# Patient Record
Sex: FEMALE | Race: WHITE | NOT HISPANIC OR LATINO | URBAN - METROPOLITAN AREA
[De-identification: names, ages, dates, MRNs, and addresses within clinical notes are randomized per-mention and may not be internally consistent; named-entity substitution may affect disease eponyms.]

---

## 2023-07-15 ENCOUNTER — EMERGENCY (EMERGENCY)
Facility: HOSPITAL | Age: 43
LOS: 0 days | Discharge: HOME | End: 2023-07-16
Attending: EMERGENCY MEDICINE
Payer: COMMERCIAL

## 2023-07-15 VITALS
OXYGEN SATURATION: 99 % | RESPIRATION RATE: 18 BRPM | WEIGHT: 179.9 LBS | HEART RATE: 83 BPM | TEMPERATURE: 98 F | DIASTOLIC BLOOD PRESSURE: 75 MMHG | SYSTOLIC BLOOD PRESSURE: 110 MMHG

## 2023-07-15 DIAGNOSIS — S52.502A UNSPECIFIED FRACTURE OF THE LOWER END OF LEFT RADIUS, INITIAL ENCOUNTER FOR CLOSED FRACTURE: ICD-10-CM
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
## 2023-07-15 DIAGNOSIS — M25.532 PAIN IN LEFT WRIST: ICD-10-CM

## 2023-07-15 PROCEDURE — 73090 X-RAY EXAM OF FOREARM: CPT | Mod: LT

## 2023-07-15 PROCEDURE — 99284 EMERGENCY DEPT VISIT MOD MDM: CPT | Mod: 25

## 2023-07-15 PROCEDURE — 29125 APPL SHORT ARM SPLINT STATIC: CPT

## 2023-07-15 PROCEDURE — 29125 APPL SHORT ARM SPLINT STATIC: CPT | Mod: LT

## 2023-07-15 PROCEDURE — 73110 X-RAY EXAM OF WRIST: CPT | Mod: LT

## 2023-07-15 RX ORDER — IBUPROFEN 200 MG
400 TABLET ORAL ONCE
Refills: 0 | Status: COMPLETED | OUTPATIENT
Start: 2023-07-15 | End: 2023-07-15

## 2023-07-15 RX ORDER — ACETAMINOPHEN 500 MG
650 TABLET ORAL ONCE
Refills: 0 | Status: COMPLETED | OUTPATIENT
Start: 2023-07-15 | End: 2023-07-15

## 2023-07-15 RX ADMIN — Medication 650 MILLIGRAM(S): at 23:50

## 2023-07-15 RX ADMIN — Medication 400 MILLIGRAM(S): at 23:49

## 2023-07-15 NOTE — ED PROVIDER NOTE - PHYSICAL EXAMINATION
CONSTITUTIONAL: Well-developed; well-nourished; NAD  SKIN: warm, dry, w/o rash  HEAD: NCAT  EYES: PERRLA, EOMI, no conjunctival injection  ENT: No nasal discharge; nl OP without erythema or exudates  NECK: Supple, non-tender  CARD: nl S1, S2; RRR, no MRG, no JVD  RESP: CTAB, normal respiratory effort  ABD: BS+, soft, NTND, no HSM  EXT: Normal ROM aside from LT wrist.  Mild swelling of the left wrist, radial, ulnar, median sensation and motor intact in left upper extremity, normal cap refill and distal pulses, mild ecchymosis over anterior surface of the left wrist  NEURO: Alert, oriented, grossly unremarkable  PSYCH: Cooperative, appropriate

## 2023-07-15 NOTE — ED PROVIDER NOTE - CLINICAL SUMMARY MEDICAL DECISION MAKING FREE TEXT BOX
43 y.o. female, no PMH, presenting for FOOSH onto left wrist.  Patient was at water park earlier today, fell onto her left outstretched hand, immediately felt pain to left wrist. Since that time pain has increased.  Patient denies numbness, weakness.  Patient took 2 regular strength Advil prior to coming to emergency department.  Patient otherwise well. On exam, pt in NAD, AAOx3, head NC/AT, CN II-XII intact, PEERL, EOMi, neck (-) midline tenderness, lungs CTA B/L, CV S1S2 regular, abdomen soft/NT/ND/(+)BS, ext (+) swelling and tenderness to left wrist, FROM of digits, (+) 2 rings noted on 4th digit, unable to remove rings due to swelling. XR (+) distal radius fx. Rings removed. Splint applied. Will d/c with ortho follow up. Return precautions given.

## 2023-07-15 NOTE — ED PROVIDER NOTE - CARE PROVIDER_API CALL
Ian Narvaez  Orthopaedic Surgery  [address] [place] [address]  Phone: [phone]  Fax: [phone]  Follow Up Time: 4-6 Days

## 2023-07-15 NOTE — ED PROVIDER NOTE - CARE PLAN
Principal Discharge DX:	Distal radius fracture, left   1 Principal Discharge DX:	Distal radius fracture, left  Secondary Diagnosis:	Fall

## 2023-07-15 NOTE — ED PROVIDER NOTE - PATIENT PORTAL LINK FT
You can access the FollowMyHealth Patient Portal offered by [facility] by registering at the following website: [email or web address]. By joining [employer]’s FollowMyHealth portal, you will also be able to view your health information using other applications (apps) compatible with our system.

## 2023-07-15 NOTE — ED PROVIDER NOTE - NSFOLLOWUPINSTRUCTIONS_ED_ALL_ED_FT
Our Emergency Department Referral Coordinators will be reaching out to you in the next 24-48 hours from 9:00am to 5:00pm with a follow up appointment. Please expect a phone call from the hospital in that time frame. If you do not receive a call or if you have any questions or concerns, you can reach them at   [phone]      Radial Fracture       A radial fracture is a break in the radius bone. The radius is a bone in the forearm, on the same side as the thumb. The forearm is the part of the arm that is between the elbow and the wrist. A radial fracture near the wrist (distal radialfracture) is the most common type of broken arm. A fracture can also occur near the elbow (radial head fracture).      What are the causes?  The most common cause of a radial fracture is falling with the arm outstretched. Other causes include:  •An accident, such as a car or bike accident.      •A hard, direct hit to the forearm.        What increases the risk?  You may be at greater risk for a radial fracture if you:  •Are female.      •Are an older adult.      •Play contact sports.      •Have a condition that causes your bones to become thin and brittle (osteoporosis).        What are the signs or symptoms?  A radial fracture causes pain immediately after the injury. Other signs and symptoms may include:  •An abnormal bend or bump in the arm (deformity).      •Swelling.      •Bruising.      •Numbness or tingling in your arm and hand.      •Limited movement of your arm and hand.        How is this diagnosed?  This condition may be diagnosed based on:  •Your symptoms and medical history.      •A physical exam.      •An X-ray.        How is this treated?  Treatment depends on how severe your fracture is, where it is, and how the pieces of the broken bone line up with each other (alignment).  •The first step may be for you to wear a temporary splint for a few days, until your swelling goes down. After the swelling goes down, you may get a cast, get a different type of splint, or have surgery.      •If your broken bone is in good alignment, you will need to wear a splint or cast for up to 6 weeks.    •If your broken bone is not aligned (is displaced), your health care provider will need to align the bone pieces. After alignment, you will need to wear a splint or cast for up to 6 weeks. To align your broken bone, your health care provider may:  •Move the bones back into position without surgery (closed reduction).      •Perform surgery to align the fracture and fix the bone pieces into place with metal screws, plates, or wires (open reduction and internal fixation, ORIF).      •Perform surgery to align the fracture and fix the bone pieces into place with pins that are attached to a stabilizing bar outside your skin (external fixation).      Treatment may also include:  •Having your cast changed after 2–3 weeks.      •Physical therapy.      •Follow-up visits and X-rays to make sure you are healing.        Follow these instructions at home:    If you have a splint:     •Wear it as told by your health care provider. Remove it only as told by your health care provider.       •Loosen the splint if your fingers tingle, become numb, or turn cold and blue.       •Keep the splint clean and dry.      If you have a cast:     • Do not stick anything inside the cast to scratch your skin. Doing that increases your risk for infection.       •Check the skin around the cast every day. Tell your health care provider about any concerns.       •You may put lotion on dry skin around the edges of the cast. Do not put lotion on the skin underneath the cast.      •Keep the cast clean and dry.      Bathing     • Do not take baths, swim, or use a hot tub until your health care provider approves. Ask your health care provider if you may take showers. You may only be allowed to take sponge baths.    •If your splint or cast is not waterproof:  •Do not let it get wet.      •Cover it with a watertight covering when you take a bath or a shower.        Activity     •  Do not lift anything with your injured arm.      • Do not use the injured arm to support your body weight until your health care provider says that you can.      •Ask your health care provider what activities are safe for you during recovery, and ask what activities you need to avoid.        Managing pain, stiffness, and swelling    •If directed, put ice on painful areas:   •If you have a removable splint, remove it as told by your health care provider.       •Put ice in a plastic bag.       •Place a towel between your skin and the bag, or between your cast and the bag.      •Leave the ice on for 20 minutes, 2–3 times a day.        • Move your fingers often to avoid stiffness and to lessen swelling.       •Raise (elevate) your arm above the level of your heart while you are sitting or lying down.      General instructions     • Do not put pressure on any part of the cast or splint until it is fully hardened, if applicable. This may take several hours.       •Take over-the-counter and prescription medicines only as told by your health care provider.      •  Do not drive until your health care provider approves. You should not drive or use heavy machinery while taking prescription pain medicine.       • Do not use any products that contain nicotine or tobacco, such as cigarettes and e-cigarettes. These can delay bone healing. If you need help quitting, ask your health care provider.      •Keep all follow-up visits as told by your health care provider. This is important.        Contact a health care provider if you have:    •Pain that does not get better with medicine.      •Swelling that gets worse.      •A bad smell coming from your cast.        Get help right away if:    •You cannot move your fingers.      •You have severe pain.    •Your fingers or your hand:  •Become numb, cold, or pale.      •Turn a bluish color.          Summary    •A radial fracture is a break in the radius bone. The radius is in the forearm, on the same side as the thumb.      •Treatment depends on how severe your fracture is, where it is, and how the pieces of the broken bone line up with each other.      •A splint or cast may be needed to help the fracture heal. A more severe break may require surgery.      This information is not intended to replace advice given to you by your health care provider. Make sure you discuss any questions you have with your health care provider.      Document Revised: 12/12/2018 Document Reviewed: 12/12/2018    Elsevier Patient Education © 2021 [employer]

## 2023-07-15 NOTE — ED ADULT NURSE NOTE - NSFALLRISKINTERV_ED_ALL_ED

## 2023-07-15 NOTE — ED PROVIDER NOTE - OBJECTIVE STATEMENT
Patient is a 43-year-old female with no past medical history presenting for FOOSH onto left wrist.  Patient was at [facility] [employer] [place] earlier today, fell onto her left outstretched hand, immediately sustained left wrist pain.  Since that time pain has increased.  Patient denies numbness, weakness.  Patient took 2 regular strength Advil prior to coming to emergency department.  Patient otherwise well

## 2023-07-16 VITALS
SYSTOLIC BLOOD PRESSURE: 115 MMHG | OXYGEN SATURATION: 99 % | DIASTOLIC BLOOD PRESSURE: 66 MMHG | TEMPERATURE: 98 F | HEART RATE: 75 BPM | RESPIRATION RATE: 18 BRPM

## 2023-07-16 PROCEDURE — 73090 X-RAY EXAM OF FOREARM: CPT | Mod: 26,LT

## 2023-07-16 PROCEDURE — 73110 X-RAY EXAM OF WRIST: CPT | Mod: 26,LT

## 2023-07-16 RX ADMIN — Medication 400 MILLIGRAM(S): at 00:20

## 2023-07-16 RX ADMIN — Medication 650 MILLIGRAM(S): at 00:20

## 2023-07-17 ENCOUNTER — APPOINTMENT (OUTPATIENT)
Dept: ORTHOPEDIC SURGERY | Facility: CLINIC | Age: 43
End: 2023-07-17
Payer: COMMERCIAL

## 2023-07-17 DIAGNOSIS — S52.592A OTHER FRACTURES OF LOWER END OF LEFT RADIUS, INITIAL ENCOUNTER FOR CLOSED FRACTURE: ICD-10-CM

## 2023-07-17 PROBLEM — Z00.00 ENCOUNTER FOR PREVENTIVE HEALTH EXAMINATION: Status: ACTIVE | Noted: 2023-07-17

## 2023-07-17 PROCEDURE — 99203 OFFICE O/P NEW LOW 30 MIN: CPT | Mod: 25

## 2023-07-17 PROCEDURE — 73110 X-RAY EXAM OF WRIST: CPT | Mod: LT

## 2023-07-17 PROCEDURE — 29075 APPL CST ELBW FNGR SHORT ARM: CPT | Mod: LT

## 2023-07-17 RX ORDER — NAPROXEN 500 MG/1
500 TABLET ORAL
Qty: 60 | Refills: 0 | Status: ACTIVE | COMMUNITY
Start: 2023-07-17 | End: 1900-01-01

## 2023-07-17 NOTE — HISTORY OF PRESENT ILLNESS
[de-identified] : Patient is a 43-year-old female here for evaluation of left wrist injury.  Patient states on 7/15/2023 she was at a water park with her son.  Patient states she slipped over a children's pool trying not to fall onto a child.  Patient landed on left outstretched wrist.  Patient went to the emergency room, x-rays were taken showing distal radius fracture.  Patient was placed in splint and told to follow-up with orthopedics.  Denies numbness or tingling.

## 2023-07-17 NOTE — PHYSICAL EXAM
[Left] : left hand [Distal Radius] : distal radius [] : no instability w/varus/valgus or ant/post stressing of fingers joints [FreeTextEntry9] : Moderate decreased range of motion secondary to pain [de-identified] : Moderate decrease strength secondary to pain

## 2023-07-17 NOTE — DATA REVIEWED
[FreeTextEntry1] : X-ray left wrist [facility]: Mildly displaced transverse distal radius fracture.  Post cast x-rays taken showing well molded cast, mildly displaced fracture

## 2023-07-17 NOTE — DISCUSSION/SUMMARY
[de-identified] : Discussed x-rays in detail with patient showing mildly displaced fracture of distal radius.  Discussed that generally these types of fractures heal well on their own but some do require surgical intervention.  Discussed treatment options detail.  Placed patient in waterproof cast with good mold.  Post cast x-rays taken showing well molded cast with mildly displaced fracture.  Patient will follow-up in 2 weeks for reevaluation by hand department for further evaluation.  Advised patient to move fingers and elbow to avoid stiffness.  Naproxen sent to patient's pharmacy, discussed side effects in detail.  Call if symptoms worsen or change.  Patient understands agrees with plan.

## 2023-07-18 PROBLEM — Z78.9 OTHER SPECIFIED HEALTH STATUS: Chronic | Status: ACTIVE | Noted: 2023-07-15

## 2023-08-04 ENCOUNTER — APPOINTMENT (OUTPATIENT)
Dept: ORTHOPEDIC SURGERY | Facility: CLINIC | Age: 43
End: 2023-08-04

## 2023-08-07 ENCOUNTER — APPOINTMENT (OUTPATIENT)
Dept: ORTHOPEDIC SURGERY | Facility: CLINIC | Age: 43
End: 2023-08-07
Payer: COMMERCIAL

## 2023-08-07 VITALS — HEIGHT: 67 IN | WEIGHT: 175 LBS | BODY MASS INDEX: 27.47 KG/M2

## 2023-08-07 PROCEDURE — 73110 X-RAY EXAM OF WRIST: CPT | Mod: LT

## 2023-08-07 PROCEDURE — 99213 OFFICE O/P EST LOW 20 MIN: CPT

## 2023-08-07 NOTE — PHYSICAL EXAM
[de-identified] : Patient has good range of motion of the digits patient has normal sensation normal cap refill there is no erythema ecchymoses or abrasions.  Cast is in good condition.

## 2023-08-07 NOTE — HISTORY OF PRESENT ILLNESS
[de-identified] : 43-year-old female had a fall resulting injury to the left wrist.  She comes in today for evaluation injury occurred about 2 weeks ago.  She is comfortable in her cast.

## 2023-08-07 NOTE — ASSESSMENT
[FreeTextEntry1] : Patient is a left-sided distal radius fracture.  It is extra-articular.  She will see us back in about 3 weeks with cast off radiographs of the left wrist.  Then will going to a removable wrist brace when she goes to [place].

## 2023-08-28 ENCOUNTER — APPOINTMENT (OUTPATIENT)
Dept: ORTHOPEDIC SURGERY | Facility: CLINIC | Age: 43
End: 2023-08-28
Payer: COMMERCIAL

## 2023-08-28 DIAGNOSIS — S52.552D OTHER EXTRAARTICULAR FRACTURE OF LOWER END OF LEFT RADIUS, SUBSEQUENT ENCOUNTER FOR CLOSED FRACTURE WITH ROUTINE HEALING: ICD-10-CM

## 2023-08-28 PROCEDURE — 99213 OFFICE O/P EST LOW 20 MIN: CPT

## 2023-08-28 PROCEDURE — 73110 X-RAY EXAM OF WRIST: CPT | Mod: LT

## 2023-08-28 PROCEDURE — L3908: CPT | Mod: LT

## 2023-08-28 NOTE — DATA REVIEWED
[FreeTextEntry1] : Radiographs 3 views of the left wrist reviewed showing a dorsally angulated distal radius fracture.  Particular.  Has been change in the fracture alignment since her previous visit.

## 2023-08-28 NOTE — PHYSICAL EXAM
[de-identified] : Patient comes out of her immobilization today.  There is deformity to the wrist.  She has good range of motion of the fingers.  Normal tenderness along the fracture site.  Normal sensation normal capillary refill.

## 2023-08-28 NOTE — HISTORY OF PRESENT ILLNESS
[de-identified] : 43-year-old female has a left-sided wrist fracture.  She comes in today for evaluation.  She has been in a cast.  The injury is about 6 weeks old.  She has no significant complaints in the cast.

## 2023-08-28 NOTE — ASSESSMENT
[FreeTextEntry1] : Fracture shifted in alignment since her last visit we discussed with her surgical intervention for open reduction internal fixation of the fracture.  Risk and benefits of surgery, limited to bleeding infection risk injury stiffness discussed.  Utilizing splinting as discharge was discussed nonoperative treatment was discussed she is going to contemplate the surgical intervention which is what is recommended at this time.

## 2023-09-01 ENCOUNTER — APPOINTMENT (OUTPATIENT)
Dept: ORTHOPEDIC SURGERY | Facility: CLINIC | Age: 43
End: 2023-09-01

## 2023-09-08 ENCOUNTER — APPOINTMENT (OUTPATIENT)
Dept: ORTHOPEDIC SURGERY | Facility: CLINIC | Age: 43
End: 2023-09-08

## 2023-09-15 ENCOUNTER — APPOINTMENT (OUTPATIENT)
Dept: ORTHOPEDIC SURGERY | Facility: CLINIC | Age: 43
End: 2023-09-15

## 2024-04-13 ENCOUNTER — NON-APPOINTMENT (OUTPATIENT)
Age: 44
End: 2024-04-13

## 2024-09-04 ENCOUNTER — NON-APPOINTMENT (OUTPATIENT)
Age: 44
End: 2024-09-04

## 2024-09-08 ENCOUNTER — EMERGENCY (EMERGENCY)
Facility: HOSPITAL | Age: 44
LOS: 0 days | Discharge: HOME | End: 2024-09-09
Attending: STUDENT IN AN ORGANIZED HEALTH CARE EDUCATION/TRAINING PROGRAM
Payer: COMMERCIAL

## 2024-09-08 VITALS
TEMPERATURE: 103 F | OXYGEN SATURATION: 98 % | WEIGHT: 160.06 LBS | HEART RATE: 108 BPM | HEIGHT: 65 IN | DIASTOLIC BLOOD PRESSURE: 67 MMHG | SYSTOLIC BLOOD PRESSURE: 100 MMHG | RESPIRATION RATE: 18 BRPM

## 2024-09-08 DIAGNOSIS — R50.9 FEVER, UNSPECIFIED: ICD-10-CM

## 2024-09-08 DIAGNOSIS — R39.15 URGENCY OF URINATION: ICD-10-CM

## 2024-09-08 DIAGNOSIS — U07.1 COVID-19: ICD-10-CM

## 2024-09-08 DIAGNOSIS — R30.0 DYSURIA: ICD-10-CM

## 2024-09-08 LAB
BASOPHILS # BLD AUTO: 0.02 K/UL — SIGNIFICANT CHANGE UP (ref 0–0.2)
BASOPHILS NFR BLD AUTO: 0.2 % — SIGNIFICANT CHANGE UP (ref 0–1)
EOSINOPHIL # BLD AUTO: 0.04 K/UL — SIGNIFICANT CHANGE UP (ref 0–0.7)
EOSINOPHIL NFR BLD AUTO: 0.5 % — SIGNIFICANT CHANGE UP (ref 0–8)
HCT VFR BLD CALC: 32.7 % — LOW (ref 37–47)
HGB BLD-MCNC: 11.3 G/DL — LOW (ref 12–16)
IMM GRANULOCYTES NFR BLD AUTO: 0.6 % — HIGH (ref 0.1–0.3)
LYMPHOCYTES # BLD AUTO: 0.91 K/UL — LOW (ref 1.2–3.4)
LYMPHOCYTES # BLD AUTO: 11.2 % — LOW (ref 20.5–51.1)
MCHC RBC-ENTMCNC: 30.1 PG — SIGNIFICANT CHANGE UP (ref 27–31)
MCHC RBC-ENTMCNC: 34.6 G/DL — SIGNIFICANT CHANGE UP (ref 32–37)
MCV RBC AUTO: 87.2 FL — SIGNIFICANT CHANGE UP (ref 81–99)
MONOCYTES # BLD AUTO: 0.57 K/UL — SIGNIFICANT CHANGE UP (ref 0.1–0.6)
MONOCYTES NFR BLD AUTO: 7 % — SIGNIFICANT CHANGE UP (ref 1.7–9.3)
NEUTROPHILS # BLD AUTO: 6.5 K/UL — SIGNIFICANT CHANGE UP (ref 1.4–6.5)
NEUTROPHILS NFR BLD AUTO: 80.5 % — HIGH (ref 42.2–75.2)
NRBC # BLD: 0 /100 WBCS — SIGNIFICANT CHANGE UP (ref 0–0)
PLATELET # BLD AUTO: 217 K/UL — SIGNIFICANT CHANGE UP (ref 130–400)
PMV BLD: 9.9 FL — SIGNIFICANT CHANGE UP (ref 7.4–10.4)
RBC # BLD: 3.75 M/UL — LOW (ref 4.2–5.4)
RBC # FLD: 12.7 % — SIGNIFICANT CHANGE UP (ref 11.5–14.5)
WBC # BLD: 8.09 K/UL — SIGNIFICANT CHANGE UP (ref 4.8–10.8)
WBC # FLD AUTO: 8.09 K/UL — SIGNIFICANT CHANGE UP (ref 4.8–10.8)

## 2024-09-08 PROCEDURE — 81001 URINALYSIS AUTO W/SCOPE: CPT

## 2024-09-08 PROCEDURE — 93010 ELECTROCARDIOGRAM REPORT: CPT

## 2024-09-08 PROCEDURE — 84703 CHORIONIC GONADOTROPIN ASSAY: CPT

## 2024-09-08 PROCEDURE — 99285 EMERGENCY DEPT VISIT HI MDM: CPT | Mod: 25

## 2024-09-08 PROCEDURE — 71045 X-RAY EXAM CHEST 1 VIEW: CPT

## 2024-09-08 PROCEDURE — 0241U: CPT

## 2024-09-08 PROCEDURE — 99284 EMERGENCY DEPT VISIT MOD MDM: CPT

## 2024-09-08 PROCEDURE — 85025 COMPLETE CBC W/AUTO DIFF WBC: CPT

## 2024-09-08 PROCEDURE — 71045 X-RAY EXAM CHEST 1 VIEW: CPT | Mod: 26

## 2024-09-08 PROCEDURE — 96374 THER/PROPH/DIAG INJ IV PUSH: CPT

## 2024-09-08 PROCEDURE — 80053 COMPREHEN METABOLIC PANEL: CPT

## 2024-09-08 PROCEDURE — 93005 ELECTROCARDIOGRAM TRACING: CPT

## 2024-09-08 PROCEDURE — 36415 COLL VENOUS BLD VENIPUNCTURE: CPT

## 2024-09-08 RX ORDER — ACETAMINOPHEN 325 MG/1
975 TABLET ORAL ONCE
Refills: 0 | Status: COMPLETED | OUTPATIENT
Start: 2024-09-08 | End: 2024-09-08

## 2024-09-08 RX ORDER — ONDANSETRON 2 MG/ML
4 INJECTION, SOLUTION INTRAMUSCULAR; INTRAVENOUS ONCE
Refills: 0 | Status: COMPLETED | OUTPATIENT
Start: 2024-09-08 | End: 2024-09-08

## 2024-09-08 RX ADMIN — ONDANSETRON 4 MILLIGRAM(S): 2 INJECTION, SOLUTION INTRAMUSCULAR; INTRAVENOUS at 23:30

## 2024-09-08 RX ADMIN — Medication 1500 MILLILITER(S): at 23:30

## 2024-09-08 RX ADMIN — ACETAMINOPHEN 975 MILLIGRAM(S): 325 TABLET ORAL at 23:30

## 2024-09-09 VITALS
TEMPERATURE: 100 F | DIASTOLIC BLOOD PRESSURE: 58 MMHG | RESPIRATION RATE: 18 BRPM | HEART RATE: 96 BPM | SYSTOLIC BLOOD PRESSURE: 94 MMHG | OXYGEN SATURATION: 93 %

## 2024-09-09 LAB
ALBUMIN SERPL ELPH-MCNC: 3.8 G/DL — SIGNIFICANT CHANGE UP (ref 3.5–5.2)
ALP SERPL-CCNC: 58 U/L — SIGNIFICANT CHANGE UP (ref 30–115)
ALT FLD-CCNC: 32 U/L — SIGNIFICANT CHANGE UP (ref 0–41)
ANION GAP SERPL CALC-SCNC: 12 MMOL/L — SIGNIFICANT CHANGE UP (ref 7–14)
APPEARANCE UR: CLEAR — SIGNIFICANT CHANGE UP
AST SERPL-CCNC: 30 U/L — SIGNIFICANT CHANGE UP (ref 0–41)
BACTERIA # UR AUTO: ABNORMAL /HPF
BILIRUB SERPL-MCNC: 0.7 MG/DL — SIGNIFICANT CHANGE UP (ref 0.2–1.2)
BILIRUB UR-MCNC: NEGATIVE — SIGNIFICANT CHANGE UP
BUN SERPL-MCNC: 7 MG/DL — LOW (ref 10–20)
CALCIUM SERPL-MCNC: 8.5 MG/DL — SIGNIFICANT CHANGE UP (ref 8.4–10.4)
CHLORIDE SERPL-SCNC: 101 MMOL/L — SIGNIFICANT CHANGE UP (ref 98–110)
CO2 SERPL-SCNC: 23 MMOL/L — SIGNIFICANT CHANGE UP (ref 17–32)
COLOR SPEC: SIGNIFICANT CHANGE UP
CREAT SERPL-MCNC: 0.7 MG/DL — SIGNIFICANT CHANGE UP (ref 0.7–1.5)
DIFF PNL FLD: ABNORMAL
EGFR: 111 ML/MIN/1.73M2 — SIGNIFICANT CHANGE UP
EPI CELLS # UR: PRESENT
FLUAV AG NPH QL: SIGNIFICANT CHANGE UP
FLUBV AG NPH QL: SIGNIFICANT CHANGE UP
GLUCOSE SERPL-MCNC: 123 MG/DL — HIGH (ref 70–99)
GLUCOSE UR QL: NEGATIVE MG/DL — SIGNIFICANT CHANGE UP
HCG SERPL QL: NEGATIVE — SIGNIFICANT CHANGE UP
KETONES UR-MCNC: ABNORMAL MG/DL
LEUKOCYTE ESTERASE UR-ACNC: ABNORMAL
NITRITE UR-MCNC: NEGATIVE — SIGNIFICANT CHANGE UP
PH UR: 6 — SIGNIFICANT CHANGE UP (ref 5–8)
POTASSIUM SERPL-MCNC: 3.5 MMOL/L — SIGNIFICANT CHANGE UP (ref 3.5–5)
POTASSIUM SERPL-SCNC: 3.5 MMOL/L — SIGNIFICANT CHANGE UP (ref 3.5–5)
PROT SERPL-MCNC: 7.1 G/DL — SIGNIFICANT CHANGE UP (ref 6–8)
PROT UR-MCNC: 30 MG/DL
RBC CASTS # UR COMP ASSIST: 5 /HPF — HIGH (ref 0–4)
RSV RNA NPH QL NAA+NON-PROBE: SIGNIFICANT CHANGE UP
SARS-COV-2 RNA SPEC QL NAA+PROBE: DETECTED
SODIUM SERPL-SCNC: 136 MMOL/L — SIGNIFICANT CHANGE UP (ref 135–146)
SP GR SPEC: 1.03 — SIGNIFICANT CHANGE UP (ref 1–1.03)
UROBILINOGEN FLD QL: 1 MG/DL — SIGNIFICANT CHANGE UP (ref 0.2–1)
WBC UR QL: 2 /HPF — SIGNIFICANT CHANGE UP (ref 0–5)

## 2024-09-09 NOTE — ED PROVIDER NOTE - NSFOLLOWUPINSTRUCTIONS_ED_ALL_ED_FT
Please follow up with your primary care doctor in 1-3 days  Please be aware of any new or worsening signs or symptoms that should prompt your return to the ER.      Viral Illness, Adult  Viruses are tiny germs that can get into a person's body and cause illness. There are many different types of viruses, and they cause many types of illness. Viral illnesses can range from mild to severe. They can affect various parts of the body.    Common illnesses that are caused by a virus include colds and the flu. Viral illnesses also include serious conditions such as HIV/AIDS (human immunodeficiency virus/acquired immunodeficiency syndrome). A few viruses have been linked to certain cancers.    What are the causes?  Many types of viruses can cause illness. Viruses invade cells in your body, multiply, and cause the infected cells to malfunction or die. When the cell dies, it releases more of the virus. When this happens, you develop symptoms of the illness, and the virus continues to spread to other cells. If the virus takes over the function of the cell, it can cause the cell to divide and grow out of control, as is the case when a virus causes cancer.    Different viruses get into the body in different ways. You can get a virus by:    Swallowing food or water that is contaminated with the virus.  Breathing in droplets that have been coughed or sneezed into the air by an infected person.  Touching a surface that has been contaminated with the virus and then touching your eyes, nose, or mouth.  Being bitten by an insect or animal that carries the virus.  Having sexual contact with a person who is infected with the virus.  Being exposed to blood or fluids that contain the virus, either through an open cut or during a transfusion.    If a virus enters your body, your body's defense system (immune system) will try to fight the virus. You may be at higher risk for a viral illness if your immune system is weak.    What are the signs or symptoms?  Symptoms vary depending on the type of virus and the location of the cells that it invades. Common symptoms of the main types of viral illnesses include:    Cold and flu viruses     Fever.  Headache.  Sore throat.  Muscle aches.  Nasal congestion.  Cough.  Digestive system (gastrointestinal) viruses     Fever.  Abdominal pain.  Nausea.  Diarrhea.  Liver viruses (hepatitis)     Loss of appetite.  Tiredness.  Yellowing of the skin (jaundice).  Brain and spinal cord viruses     Fever.  Headache.  Stiff neck.  Nausea and vomiting.  Confusion or sleepiness.  Skin viruses     Warts.  Itching.  Rash.  Sexually transmitted viruses     Discharge.  Swelling.  Redness.  Rash.  How is this treated?  Viruses can be difficult to treat because they live within cells. Antibiotic medicines do not treat viruses because these drugs do not get inside cells. Treatment for a viral illness may include:    Resting and drinking plenty of fluids.  Medicines to relieve symptoms. These can include over-the-counter medicine for pain and fever, medicines for cough or congestion, and medicines to relieve diarrhea.  Antiviral medicines. These drugs are available only for certain types of viruses. They may help reduce flu symptoms if taken early. There are also many antiviral medicines for hepatitis and HIV/AIDS.    Some viral illnesses can be prevented with vaccinations. A common example is the flu shot.    Follow these instructions at home:  Medicines     Image   Take over-the-counter and prescription medicines only as told by your health care provider.  If you were prescribed an antiviral medicine, take it as told by your health care provider. Do not stop taking the medicine even if you start to feel better.  Be aware of when antibiotics are needed and when they are not needed. Antibiotics do not treat viruses. If your health care provider thinks that you may have a bacterial infection as well as a viral infection, you may get an antibiotic.    Do not ask for an antibiotic prescription if you have been diagnosed with a viral illness. That will not make your illness go away faster.  Frequently taking antibiotics when they are not needed can lead to antibiotic resistance. When this develops, the medicine no longer works against the bacteria that it normally fights.    General instructions     Drink enough fluids to keep your urine clear or pale yellow.  Rest as much as possible.  Return to your normal activities as told by your health care provider. Ask your health care provider what activities are safe for you.  Keep all follow-up visits as told by your health care provider. This is important.  How is this prevented?  ImageTake these actions to reduce your risk of viral infection:    Eat a healthy diet and get enough rest.  Wash your hands often with soap and water. This is especially important when you are in public places. If soap and water are not available, use hand .  Avoid close contact with friends and family who have a viral illness.  If you travel to areas where viral gastrointestinal infection is common, avoid drinking water or eating raw food.  Keep your immunizations up to date. Get a flu shot every year as told by your health care provider.  Do not share toothbrushes, nail clippers, razors, or needles with other people.  Always practice safe sex.    Contact a health care provider if:  You have symptoms of a viral illness that do not go away.  Your symptoms come back after going away.  Your symptoms get worse.  Get help right away if:  You have trouble breathing.  You have a severe headache or a stiff neck.  You have severe vomiting or abdominal pain.  This information is not intended to replace advice given to you by your health care provider. Make sure you discuss any questions you have with your health care provider.    Fever, Adult    A fever is an increase in the body's temperature. It is usually defined as a temperature of 100.4°F (38°C) or higher. Brief mild or moderate fevers generally have no long-term effects, and they often do not require treatment. Moderate or high fevers may make you feel uncomfortable and can sometimes be a sign of a serious illness or disease. The sweating that may occur with repeated or prolonged fever may also cause dehydration.    Fever is confirmed by taking a temperature with a thermometer. A measured temperature can vary with:    Age.  Time of day.  Location of the thermometer:  Mouth (oral).  Rectum (rectal).  Ear (tympanic).  Underarm (axillary).  Forehead (temporal).    HOME CARE INSTRUCTIONS  Pay attention to any changes in your symptoms. Take these actions to help with your condition:    Take over-the counter and prescription medicines only as told by your health care provider. Follow the dosing instructions carefully.  If you were prescribed an antibiotic medicine, take it as told by your health care provider. Do not stop taking the antibiotic even if you start to feel better.  Rest as needed.  Drink enough fluid to keep your urine clear or pale yellow. This helps to prevent dehydration.  Sponge yourself or bathe with room-temperature water to help reduce your body temperature as needed. Do not use ice water.   Do not overbundle yourself in blankets or heavy clothes.    SEEK MEDICAL CARE IF:  You cannot eat or drink without vomiting.  You have pain when you urinate.  Your symptoms do not improve with treatment.  You develop new symptoms.  You develop excessive weakness.    SEEK IMMEDIATE MEDICAL CARE IF:  You have shortness of breath or have trouble breathing.  You are dizzy or you faint.  You are disoriented or confused.  You develop signs of dehydration, such as a dry mouth, decreased urination, or paleness.  You develop severe pain in your abdomen.  You have persistent vomiting or diarrhea.  You develop a skin rash.  Your symptoms suddenly get worse.    ADDITIONAL NOTES AND INSTRUCTIONS    Please follow up with your Primary MD in 24-48 hr.  Seek immediate medical care for any new/worsening signs or symptoms

## 2024-09-09 NOTE — ED PROVIDER NOTE - PATIENT PORTAL LINK FT
You can access the FollowMyHealth Patient Portal offered by [facility] by registering at the following website: [email or web address]. By joining [employer]’s FollowMyHealth portal, you will also be able to view your health information using other applications (apps) compatible with our system.

## 2024-09-09 NOTE — ED PROVIDER NOTE - CLINICAL SUMMARY MEDICAL DECISION MAKING FREE TEXT BOX
pt with viral like symptoms    Independently interpretted by Deny Rainey DO:  XR interpretation: No cardiopulmonary disease,   EKG interpretation: no RANDAL, NSR    Appropriate medications for patient's presenting complaints were ordered and effects were reassessed. Patient's external records were reviewed    Escalation to admission and/or observation was considered.  Patient feels much better and is comfortable with discharge.  Appropriate follow-up was arranged.

## 2024-09-09 NOTE — ED PROVIDER NOTE - CARE PROVIDER_API CALL
Ko Perez  Internal Medicine  [address] [place] [address]  Phone: [phone]  Fax: [phone]  Follow Up Time: 1-3 Days

## 2024-09-09 NOTE — ED PROVIDER NOTE - PHYSICAL EXAMINATION
CONSTITUTIONAL: no acute distress  SKIN: skin exam is warm and dry,  HEAD: Normocephalic; atraumatic.   ENT: [facility], +erythematous oropharynx, no exudates, uvula midline, tolerating secretions   NECK: ROM intact.  CARD: S1, S2 normal, no murmur  RESP: dry cough on exam, no increased wob   ABD: soft; non-distended; non-tender.    EXT: Normal ROM.    NEURO: awake, alert, following commands, oriented, grossly unremarkable. No Focal deficits. GCS 15.   PSYCH: Cooperative, appropriate.

## 2024-09-09 NOTE — ED PROVIDER NOTE - OBJECTIVE STATEMENT
44 year old female, no past medical history, who presents with fever. patient with fever that began x4 days ago with urgency and dysuria. patient presented to urgent care that day with +ua, started on cefuroxime which she has been compliant with. patient reports resolution of urinary symptoms, however, persistent fever. patient also reports cough, sore throat and nasal congestion. denies chest pain, vomiting/diarrhea. patient currently on menses.

## 2025-07-01 ENCOUNTER — NON-APPOINTMENT (OUTPATIENT)
Age: 45
End: 2025-07-01